# Patient Record
Sex: FEMALE | Race: BLACK OR AFRICAN AMERICAN | NOT HISPANIC OR LATINO | URBAN - METROPOLITAN AREA
[De-identification: names, ages, dates, MRNs, and addresses within clinical notes are randomized per-mention and may not be internally consistent; named-entity substitution may affect disease eponyms.]

---

## 2021-09-08 ENCOUNTER — OFFICE (OUTPATIENT)
Dept: URBAN - METROPOLITAN AREA CLINIC 11 | Facility: CLINIC | Age: 61
End: 2021-09-08

## 2021-09-08 VITALS
SYSTOLIC BLOOD PRESSURE: 95 MMHG | DIASTOLIC BLOOD PRESSURE: 6 MMHG | WEIGHT: 105 LBS | HEART RATE: 82 BPM | HEIGHT: 66 IN | OXYGEN SATURATION: 97 %

## 2021-09-08 DIAGNOSIS — Z48.03 ENCOUNTER FOR CHANGE OR REMOVAL OF DRAINS: ICD-10-CM

## 2021-09-08 DIAGNOSIS — R52 PAIN, UNSPECIFIED: ICD-10-CM

## 2021-09-08 DIAGNOSIS — R93.3 ABNORMAL FINDINGS ON DIAGNOSTIC IMAGING OF OTHER PARTS OF DI: ICD-10-CM

## 2021-09-08 PROCEDURE — 99204 OFFICE O/P NEW MOD 45 MIN: CPT | Performed by: INTERNAL MEDICINE

## 2021-09-08 RX ORDER — SODIUM PICOSULFATE, MAGNESIUM OXIDE, AND ANHYDROUS CITRIC ACID 10; 3.5; 12 MG/160ML; G/160ML; G/160ML
LIQUID ORAL
Qty: 1 | Refills: 0 | Status: ACTIVE
Start: 2021-09-08

## 2021-09-08 NOTE — SERVICENOTES
The patient has what appears to be a [place] drain placed intra-abdominally by either interventional radiology or surgery.  I explained to her that this is not something that we usually follow or address but that I would like to get more information as to exactly what was placed.  We will obtain her most recent CT scan from a few weeks ago to see if there is any evidence of any persistent abscess or fluid collection.  It is a good sign that it is no longer draining any fluid as this would indicate that there is no fluid collection and that it may be able to be removed.  Depending on who placed this we would need to have her either seen by our interventional Radiology or surgery.  We will know more once we get records from her hospital.  In the meantime we will review her records to see if any other workup is needed.  She is overdue for average risk screening colonoscopy so we will schedule this once her drain issue has been addressed.  I did explain to her the importance of making sure that her blood sugar is under very good control otherwise she may risk having the colonoscopy canceled on the day of the procedure due to anesthesia risks.

## 2021-09-08 NOTE — SERVICEHPINOTES
Ms. Tse is a 60-year-old female here for evaluation of an abdominal drain. The patient states that the drain was placed when she was admitted to [facility] in [place], [place] a few months ago. She states at that time she was having abdominal pain, nausea, and vomiting. She states that since the drain was placed her symptoms improved and that he used to drain fluid but that it is no longer draining fluid for the past month. She states that was placed by a surgeon. She is unaware of the exact reason for the drain placement but thinks that she may have had an intra-abdominal abscess at some point. She states that she did have a CT scan at [facility] a few weeks ago when she went to the ED because of severe hyperglycemia but is unaware of this result either. 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 She has never had a colonoscopy and denies any first-degree family history of colon cancer or colon polyps. She states she normally has regular bowel movements but will occasionally have a loose bowel movement or constipation depending on her diet. Sometimes this is associated with some abdominal pain but overall she does not have abdominal pain on a regular basis. She denies any fevers, chills, or weight loss. She does have significant diabetes and has had difficulty with maintaining her blood sugar at adequate level recently but states that over the past few weeks her medications have been adjusted and she is having much better control of her blood sugar.

## 2021-10-04 ENCOUNTER — OFFICE (OUTPATIENT)
Dept: URBAN - METROPOLITAN AREA CLINIC 11 | Facility: CLINIC | Age: 61
End: 2021-10-04

## 2021-10-04 VITALS
HEIGHT: 66 IN | WEIGHT: 104 LBS | DIASTOLIC BLOOD PRESSURE: 66 MMHG | SYSTOLIC BLOOD PRESSURE: 112 MMHG | OXYGEN SATURATION: 99 % | HEART RATE: 77 BPM

## 2021-10-04 DIAGNOSIS — I25.10 ATHEROSCLEROTIC HEART DISEASE OF NATIVE CORONARY ARTERY WITH: ICD-10-CM

## 2021-10-04 DIAGNOSIS — K21.9 GASTRO-ESOPHAGEAL REFLUX DISEASE WITHOUT ESOPHAGITIS: ICD-10-CM

## 2021-10-04 DIAGNOSIS — K59.00 CONSTIPATION, UNSPECIFIED: ICD-10-CM

## 2021-10-04 DIAGNOSIS — R52 PAIN, UNSPECIFIED: ICD-10-CM

## 2021-10-04 DIAGNOSIS — Z48.03 ENCOUNTER FOR CHANGE OR REMOVAL OF DRAINS: ICD-10-CM

## 2021-10-04 PROCEDURE — 99214 OFFICE O/P EST MOD 30 MIN: CPT

## 2021-10-04 NOTE — SERVICEHPINOTES
Ms. Tse is a 60 yr old female who presents today for her G-tube removal. She also has a history of Gerd and constipation. She had a gastrostomy tube placed in April of 2021 in [place], [place]. Pt had developed a small bowel obstruction 5 days after an exploratory laparotomy. She originally was admitted to the hospital for abdominal pain. She does have a history of an open distal pancreatectomy, splenectomy and cholecystectomy. She is here today to have the G-tube removed. She states that she has a small amount of drainage. She will perform dressing changes and empty drain once a week. She is currently denies any fever, chills, nausea, vomiting, diarrhea, abdominal pain or rectal bleeding. She tends to be more constipated but will take Miralax to help relieve her symptoms. She is due to have her colonoscopy after her drain is removed. She does not take any blood thinners. We received her cardiac clearance from Dr. Lockett on 9/30/21. She states her Gerd is well controlled on Omeprazole 20 mg. She will occasionally have heartburn but states those episodes are related to her diet.   PMHX:BRThe patient states that the drain was placed when she was admitted to [facility] in [place], [place] a few months ago. She states at that time she was having abdominal pain, nausea, and vomiting. She states that since the drain was placed her symptoms improved and that he used to drain fluid but that it is no longer draining fluid for the past month. She states that was placed by a surgeon. She is unaware of the exact reason for the drain placement but thinks that she may have had an intra-abdominal abscess at some point. She states that she did have a CT scan at [facility] a few weeks ago when she went to the ED because of severe hyperglycemia but is unaware of this result either. 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 She has never had a colonoscopy and denies any first-degree family history of colon cancer or colon polyps.

## 2021-10-04 NOTE — SERVICENOTES
MD Addendum: The patient was seen along with Dottie Taylor NP.  I have evaluated the patient, discussed with NP, and agree with the above documented findings, impression, and plan of care.  Overall the patient is doing well. I did review her records and it does appear the patient did have what sounded like small-bowel obstruction with gastric outlet obstruction for which she underwent venting gastrostomy tube placement.  She underwent surgery with removal of scar tissue and since then she has done very well.  She states that she can tolerate food and has not had any drainage from the G-tube.  She has not had any issues nausea vomiting.  Because of this she would like the G-tube removed.  We did remove this at bedside today with these by gently pulling through the stoma.  I did bandage the area and recommended that she not remove the bandage for 24 hours.  I did recommend that she notify us if she has persistent leakage or drainage from this area in the next 2-3 weeks.  The meantime we will go ahead and schedule colonoscopy as we now have clearance.-TERRA